# Patient Record
Sex: MALE | Race: WHITE
[De-identification: names, ages, dates, MRNs, and addresses within clinical notes are randomized per-mention and may not be internally consistent; named-entity substitution may affect disease eponyms.]

---

## 2017-05-08 ENCOUNTER — HOSPITAL ENCOUNTER (OUTPATIENT)
Dept: HOSPITAL 75 - CARD | Age: 42
End: 2017-05-08
Attending: INTERNAL MEDICINE
Payer: COMMERCIAL

## 2017-05-08 DIAGNOSIS — I10: ICD-10-CM

## 2017-05-08 DIAGNOSIS — E78.2: ICD-10-CM

## 2017-05-08 DIAGNOSIS — I25.10: Primary | ICD-10-CM

## 2017-05-08 PROCEDURE — 93306 TTE W/DOPPLER COMPLETE: CPT

## 2017-05-11 NOTE — ECHOCARDIOGRAPHY REPORT
DATE OF SERVICE:  05/08/2017



TEST DATE:

05/08/17



MEASUREMENT:

LVID end diastolic 5.1 

IVS thickness 1.0 

LVPW thickness 1.0 

Left atrial diameter 3.9 

Ejection fraction 60%.



FINDINGS:

1.  Technical quality is good.

2.  The left ventricle is normal in size with normal contractility, systolic

function appeared to be normal.  Estimated ejection fraction 60%.

3.  The left atrium is normal in size.  No clot or thrombus were seen within the

left atrium.

4.  The right atrium and right ventricle are normal in size.  No clot or

thrombus were seen within the right side.

5.  Mitral valve is normal in morphology with mild mitral regurgitation noted by

color Doppler flow.  No mitral valve prolapse.  No mitral valve stenosis.

6.  Aortic valve is trileaflet with normal opening and closing pattern.  No

significant aortic stenosis or regurgitation was seen.

7.  Tricuspid valve is normal in morphology with mild tricuspid regurgitation

noted by color Doppler flow.  Doppler across the tricuspid valve estimated

pulmonary artery pressure of 25+ right atrial pressure.

8.  Pulmonic valve is functioning normally.

9.  No pericardial effusion.



CONCLUSION:

1.  Normal left ventricular size and systolic function, estimated ejection

fraction 60%.

2.  Mild mitral and tricuspid regurgitation.

3.  Estimated pulmonary artery pressure of 35 mmHg.





Job ID: [id number]

DocumentID: [id number]

Dictated Date:  05/10/2017 14:35:04

Transcription Date: 05/10/2017 15:06:57

Dictated By: JACKY BROWN MD

## 2017-05-15 ENCOUNTER — HOSPITAL ENCOUNTER (OUTPATIENT)
Dept: HOSPITAL 75 - CARD | Age: 42
End: 2017-05-15
Attending: INTERNAL MEDICINE
Payer: COMMERCIAL

## 2017-05-15 VITALS — BODY MASS INDEX: 40.43 KG/M2 | HEIGHT: 74 IN | WEIGHT: 315 LBS

## 2017-05-15 VITALS — SYSTOLIC BLOOD PRESSURE: 133 MMHG | DIASTOLIC BLOOD PRESSURE: 78 MMHG

## 2017-05-15 DIAGNOSIS — E78.2: ICD-10-CM

## 2017-05-15 DIAGNOSIS — I25.10: Primary | ICD-10-CM

## 2017-05-15 DIAGNOSIS — I10: ICD-10-CM

## 2017-05-15 PROCEDURE — 93017 CV STRESS TEST TRACING ONLY: CPT

## 2017-05-15 PROCEDURE — 78452 HT MUSCLE IMAGE SPECT MULT: CPT

## 2017-05-16 NOTE — STRESS TEST
DATE OF SERVICE:  05/15/2017



EXERCISE MYOVIEW STRESS TEST



REFERRING PHYSICIAN:

No referring physician.



Baseline heart rate is 64.  Baseline blood pressure 133/78.  Baseline EKG is

sinus rhythm with no ischemic changes.



SUMMARY:

The patient was injected with 10.25 mCi of technetium-99 Myoview and the resting

images were obtained.  Then, the patient started exercising with the baseline

heart rate, blood pressure and EKG mentioned above.  At minute 7 and 18 seconds,

the patient was injected with 30.6 mCi of technetium-99 Myoview.  The patient

was able to exercise for a total of 8 minutes 20 seconds on standard Jarod

protocol, achieving maximum heart rate of 153 which is 85% of maximum expected

heart rate.  With peak exercise level the EKG was showing nondiagnostic changes.

 Blood pressure was 214/102.  During recovery, heart rate and blood pressure

returned to baseline.  EKG returned to baseline.



The resting and stress images were reviewed and compared in the short axis,

horizontal long axis and vertical long axis views.  Review of the images showed

diaphragmatic attenuation with no significant ischemia or infarction on SPECT

images.  There is mild decreased uptake at the mid to apical inferolateral wall

which is fixed.  



SSS is 4.  SDS 0.  TID value is 0.89.  On the Gated images, the left ventricle

appeared to be normal size with normal contractility.  Calculated ejection

fraction 56%.



CONCLUSION:

1.  Good exercise tolerance a total of 8 minutes 20 seconds on standard Jarod

protocol, total of 10.1 METS achieving 85% of maximum expected heart rate.

2.  Severe hypertensive response to exercise returned to baseline during

recovery.

3.  Nondiagnostic EKG changes with exercise returned to baseline during

recovery.

4.  Typical male pattern with no significant ischemia or infarction on SPECT

images.

5.  Normal left ventricular size with normal contractility, calculated ejection

fraction 56%.





Job ID: [id number]

DocumentID: [id number]

Dictated Date:  05/15/2017 11:45:21

Transcription Date: 05/15/2017 14:06:17

Dictated By: JACKY BROWN MD

## 2019-02-27 ENCOUNTER — HOSPITAL ENCOUNTER (OUTPATIENT)
Dept: HOSPITAL 75 - CARD | Age: 44
End: 2019-02-27
Attending: PHYSICIAN ASSISTANT
Payer: COMMERCIAL

## 2019-02-27 VITALS — BODY MASS INDEX: 40.43 KG/M2 | HEIGHT: 74 IN | WEIGHT: 315 LBS

## 2019-02-27 DIAGNOSIS — E78.2: ICD-10-CM

## 2019-02-27 DIAGNOSIS — I25.10: Primary | ICD-10-CM

## 2019-02-27 DIAGNOSIS — I21.3: ICD-10-CM

## 2019-02-27 DIAGNOSIS — I10: ICD-10-CM

## 2019-02-27 PROCEDURE — 93017 CV STRESS TEST TRACING ONLY: CPT

## 2019-02-27 PROCEDURE — 78452 HT MUSCLE IMAGE SPECT MULT: CPT

## 2019-02-27 NOTE — STRESS TEST
DATE OF SERVICE:  02/27/2019



EXERCISE MYOVIEW STRESS TEST REPORT



REFERRING PHYSICIAN:

Dr. Pearson's office.



Baseline heart rate is 76.  Baseline blood pressure is 138/74.  Baseline EKG is

sinus rhythm with right bundle branch block.



In summary, the patient was injected with 10.37 mCi of technetium-99 Myoview and

the resting images were obtained.  Then, the patient started exercising with a

baseline heart rate, blood pressure and EKG mentioned above.  The patient was

able to exercise for 7 minutes and 30 seconds on standard Jarod protocol.  With

peak exercise level, EKG was showing minimal nondiagnostic changes.  Blood

pressure was 200/90.  During recovery, heart rate and blood pressure returned to

baseline.  EKG returned to baseline.



The resting and stress images were reviewed and compared in the short axis,

horizontal long axis, and vertical long axis views.  Review of the images showed

diaphragmatic attenuation with decreased uptake at the inferior wall and

inferolateral wall with no significant reversibility on the attenuation

correction images.  There is mild decreased uptake at the inferior apical

segment.  SSS is 2, SDS 1, and TID value 0.96.  On the gated images, the left

ventricle appeared to be in normal size with normal contractility.  Calculated

ejection fraction is 60%.



CONCLUSION:

1.  Good exercise tolerance, a total of 7 minutes 30 seconds on standard Jarod

protocol, total of 9.1 METs, achieving 97% of maximum expected heart rate.

2.  Baseline hypertension persisted during the test.

3.  Baseline right bundle branch block with nondiagnostic EKG changes with

exercise returned to baseline during recovery.

4.  Diaphragmatic attenuation with typical male pattern.  No significant

ischemia or infarction was noted on SPECT images.

5.  Normal left ventricular size with normal contractility.  Calculated ejection

fraction is 60%.





Job ID: [id number]

DocumentID: [id number]

Dictated Date:  02/27/2019 15:41:19

Transcription Date: 02/27/2019 18:01:49

Dictated By: JACKY PEARSON MD

## 2020-03-13 ENCOUNTER — HOSPITAL ENCOUNTER (OUTPATIENT)
Dept: HOSPITAL 75 - CARD | Age: 45
End: 2020-03-13
Attending: INTERNAL MEDICINE
Payer: COMMERCIAL

## 2020-03-13 DIAGNOSIS — I25.10: ICD-10-CM

## 2020-03-13 DIAGNOSIS — E78.2: Primary | ICD-10-CM

## 2020-03-13 DIAGNOSIS — I11.9: ICD-10-CM

## 2020-03-13 DIAGNOSIS — I34.0: ICD-10-CM

## 2020-03-13 DIAGNOSIS — Z82.49: ICD-10-CM

## 2020-03-13 PROCEDURE — 93306 TTE W/DOPPLER COMPLETE: CPT

## 2020-11-02 ENCOUNTER — HOSPITAL ENCOUNTER (OUTPATIENT)
Dept: HOSPITAL 75 - SLEEP | Age: 45
Discharge: HOME | End: 2020-11-02
Attending: INTERNAL MEDICINE
Payer: COMMERCIAL

## 2020-11-02 DIAGNOSIS — I25.10: ICD-10-CM

## 2020-11-02 DIAGNOSIS — G47.33: Primary | ICD-10-CM

## 2020-11-02 DIAGNOSIS — Z87.891: ICD-10-CM

## 2020-11-02 DIAGNOSIS — I10: ICD-10-CM

## 2020-11-02 DIAGNOSIS — G47.10: ICD-10-CM

## 2020-11-02 DIAGNOSIS — E78.2: ICD-10-CM

## 2020-11-02 DIAGNOSIS — I49.9: ICD-10-CM

## 2021-01-03 ENCOUNTER — HOSPITAL ENCOUNTER (EMERGENCY)
Dept: HOSPITAL 75 - ER | Age: 46
Discharge: HOME | End: 2021-01-03
Payer: COMMERCIAL

## 2021-01-03 VITALS — DIASTOLIC BLOOD PRESSURE: 61 MMHG | SYSTOLIC BLOOD PRESSURE: 141 MMHG

## 2021-01-03 VITALS — BODY MASS INDEX: 40.43 KG/M2 | HEIGHT: 74.02 IN | WEIGHT: 315 LBS

## 2021-01-03 DIAGNOSIS — E66.9: ICD-10-CM

## 2021-01-03 DIAGNOSIS — I25.2: ICD-10-CM

## 2021-01-03 DIAGNOSIS — U07.1: Primary | ICD-10-CM

## 2021-01-03 LAB
ALBUMIN SERPL-MCNC: 4.3 GM/DL (ref 3.2–4.5)
ALP SERPL-CCNC: 68 U/L (ref 40–136)
ALT SERPL-CCNC: 33 U/L (ref 0–55)
BASOPHILS # BLD AUTO: 0 10^3/UL (ref 0–0.1)
BASOPHILS NFR BLD AUTO: 0 % (ref 0–10)
BILIRUB SERPL-MCNC: 0.5 MG/DL (ref 0.1–1)
BUN/CREAT SERPL: 15
CALCIUM SERPL-MCNC: 8.9 MG/DL (ref 8.5–10.1)
CHLORIDE SERPL-SCNC: 102 MMOL/L (ref 98–107)
CO2 SERPL-SCNC: 25 MMOL/L (ref 21–32)
CREAT SERPL-MCNC: 0.8 MG/DL (ref 0.6–1.3)
EOSINOPHIL # BLD AUTO: 0 10^3/UL (ref 0–0.3)
EOSINOPHIL NFR BLD AUTO: 0 % (ref 0–10)
GFR SERPLBLD BASED ON 1.73 SQ M-ARVRAT: > 60 ML/MIN
GLUCOSE SERPL-MCNC: 112 MG/DL (ref 70–105)
HCT VFR BLD CALC: 47 % (ref 40–54)
HGB BLD-MCNC: 15.9 G/DL (ref 13.3–17.7)
LYMPHOCYTES # BLD AUTO: 2.5 10^3/UL (ref 1–4)
LYMPHOCYTES NFR BLD AUTO: 21 % (ref 12–44)
MANUAL DIFFERENTIAL PERFORMED BLD QL: NO
MCH RBC QN AUTO: 31 PG (ref 25–34)
MCHC RBC AUTO-ENTMCNC: 34 G/DL (ref 32–36)
MCV RBC AUTO: 89 FL (ref 80–99)
MONOCYTES # BLD AUTO: 1.4 10^3/UL (ref 0–1)
MONOCYTES NFR BLD AUTO: 12 % (ref 0–12)
NEUTROPHILS # BLD AUTO: 7.9 10^3/UL (ref 1.8–7.8)
NEUTROPHILS NFR BLD AUTO: 66 % (ref 42–75)
PLATELET # BLD: 347 10^3/UL (ref 130–400)
PMV BLD AUTO: 8.5 FL (ref 9–12.2)
POTASSIUM SERPL-SCNC: 4.4 MMOL/L (ref 3.6–5)
PROT SERPL-MCNC: 8.5 GM/DL (ref 6.4–8.2)
SODIUM SERPL-SCNC: 138 MMOL/L (ref 135–145)
WBC # BLD AUTO: 11.9 10^3/UL (ref 4.3–11)

## 2021-01-03 PROCEDURE — 93005 ELECTROCARDIOGRAM TRACING: CPT

## 2021-01-03 PROCEDURE — 85025 COMPLETE CBC W/AUTO DIFF WBC: CPT

## 2021-01-03 PROCEDURE — 84484 ASSAY OF TROPONIN QUANT: CPT

## 2021-01-03 PROCEDURE — 36415 COLL VENOUS BLD VENIPUNCTURE: CPT

## 2021-01-03 PROCEDURE — 84145 PROCALCITONIN (PCT): CPT

## 2021-01-03 PROCEDURE — 71045 X-RAY EXAM CHEST 1 VIEW: CPT

## 2021-01-03 PROCEDURE — 80053 COMPREHEN METABOLIC PANEL: CPT

## 2021-01-03 PROCEDURE — 86141 C-REACTIVE PROTEIN HS: CPT

## 2021-01-03 NOTE — ED CHEST PAIN
General


Chief Complaint:  Chest Pain


Stated Complaint:  COVID+;COUGH;CHEST TIGHTNESS;SOB


Source:  patient


Exam Limitations:  no limitations





History of Present Illness


Date Seen by Provider:  Buzz 3, 2021


Time Seen by Provider:  21:27


Initial Comments


To ER by private vehicle with reports of chest tightness that lasted for about 

20 to 30 seconds worse with deep breathing that started at home.  It is gone 

now.  No fevers.  He is Covid positive and became symptomatic on 12/26/2020.  He

subsequently tested positive.  He went to urgent care today and was given an 

albuterol inhaler.  He was also given dexamethasone on Thursday, 12/28/2020.  He

states he has a history of a heart attack in 2007.  He states that his EKG was 

normal but his troponin was markedly elevated.  That was followed with a cardiac

catheterization but he did not have any stents placed.  He is otherwise healthy 

except for hypertension and follows with Dr. Pearson.


Timing/Duration:  intermittent


Severity/Quality:  moderate


Location:  central


Radiation:  no radiation


Activities at Onset:  none





Allergies and Home Medications


Allergies


Coded Allergies:  


     No Known Drug Allergies (Unverified , 5/15/17)





Patient Home Medication List


Home Medication List Reviewed:  Yes





Review of Systems


Review of Systems


Constitutional:  see HPI


EENTM:  No Symptoms Reported


Respiratory:  See HPI, Cough


Cardiovascular:  See HPI, Chest Pain


Gastrointestinal:  No Symptoms Reported


Genitourinary:  No Symptoms Reported


Musculoskeletal:  no symptoms reported


Skin:  no symptoms reported


Psychiatric/Neurological:  No Symptoms Reported


Endocrine:  No Symptoms Reported





Physical Exam


Vital Signs


Capillary Refill :


Height, Weight, BMI


Height: 6'2.00"


Weight: 332lbs. 0.0oz. 150.253343eo; 42.6 BMI


Method:


General Appearance:  No Apparent Distress, WD/WN, Obese, Other (Alert and 

oriented very pleasant no distress heart rate is in the 80s and oxygen 

saturation is 98% on room air after ambulating to room 10 from the parking lot.)


Neck:  Full Range of Motion, Normal Inspection


Respiratory:  Normal Breath Sounds, No Accessory Muscle Use, No Respiratory 

Distress


Cardiovascular:  Regular Rate, Rhythm, Normal Peripheral Pulses


Gastrointestinal:  Normal Bowel Sounds, Non Tender, Soft


Extremity:  Normal Capillary Refill, Normal Inspection


Neurologic/Psychiatric:  Alert, Oriented x3


Skin:  Normal Color, Warm/Dry





Progress/Results/Core Measures


Results/Orders


My Orders





Orders - MITA MARTINEZ


Cbc With Automated Diff (1/3/21 21:25)


Comprehensive Metabolic Panel (1/3/21 21:25)


Procalcitonin (Pct) (1/3/21 21:25)


Hs C Reactive Protein (1/3/21 21:25)


Troponin I (1/3/21 21:25)


Ekg Tracing (1/3/21 21:25)


Chest 1 View, Ap/Pa Only (1/3/21 21:25)


Ed Iv/Invasive Line Start (1/3/21 21:25)








Departure


Impression





   Primary Impression:  


   Chest pain


   Additional Impression:  


   COVID-19


Disposition:  01 HOME, SELF-CARE


Condition:  Stable





Departure-Patient Inst.


Decision time for Depature:  21:30


Referrals:  


NO,LOCAL PHYSICIAN (PCP/Family)


Primary Care Physician


Patient Instructions:  Coronavirus Disease 2019 (COVID-19) ED





Add. Discharge Instructions:  


1.  Continue current treatment regimen.  Return to ER for any worsening.  

Follow-up with your doctor next week.





All discharge instructions reviewed with patient and/or family. Voiced 

understanding.











MITA MARTINEZ              Buzz 3, 2021 21:31

## 2021-01-04 NOTE — DIAGNOSTIC IMAGING REPORT
INDICATION: Chest pain



COMPARISON: None



FINDINGS:



Single view of the chest demonstrates trace effusion in the right

costophrenic angle. The left lung is clear. The heart is normal.

There is no pneumothorax. Questionable atelectasis is seen in the

right base. Follow-up is recommended. Osseous structures normal.



IMPRESSION: Probable atelectasis right base with small effusion.

Follow-up recommended.



Dictated by: 



  Dictated on workstation # [id number]